# Patient Record
Sex: MALE | Race: WHITE | NOT HISPANIC OR LATINO
[De-identification: names, ages, dates, MRNs, and addresses within clinical notes are randomized per-mention and may not be internally consistent; named-entity substitution may affect disease eponyms.]

---

## 2019-01-07 PROBLEM — Z00.00 ENCOUNTER FOR PREVENTIVE HEALTH EXAMINATION: Status: ACTIVE | Noted: 2019-01-07

## 2019-01-08 ENCOUNTER — RECORD ABSTRACTING (OUTPATIENT)
Age: 59
End: 2019-01-08

## 2019-01-08 DIAGNOSIS — Z80.42 FAMILY HISTORY OF MALIGNANT NEOPLASM OF PROSTATE: ICD-10-CM

## 2019-01-08 DIAGNOSIS — Z82.49 FAMILY HISTORY OF ISCHEMIC HEART DISEASE AND OTHER DISEASES OF THE CIRCULATORY SYSTEM: ICD-10-CM

## 2019-01-08 DIAGNOSIS — Z86.59 PERSONAL HISTORY OF OTHER MENTAL AND BEHAVIORAL DISORDERS: ICD-10-CM

## 2019-01-08 DIAGNOSIS — R41.3 OTHER AMNESIA: ICD-10-CM

## 2019-01-08 DIAGNOSIS — Z87.19 PERSONAL HISTORY OF OTHER DISEASES OF THE DIGESTIVE SYSTEM: ICD-10-CM

## 2019-01-08 DIAGNOSIS — R14.0 ABDOMINAL DISTENSION (GASEOUS): ICD-10-CM

## 2019-01-08 DIAGNOSIS — Z86.39 PERSONAL HISTORY OF OTHER ENDOCRINE, NUTRITIONAL AND METABOLIC DISEASE: ICD-10-CM

## 2019-01-08 RX ORDER — THIAMINE HCL 100 MG
500 TABLET ORAL
Refills: 0 | Status: ACTIVE | COMMUNITY

## 2019-01-08 RX ORDER — CHOLECALCIFEROL (VITAMIN D3) 25 MCG
25 MCG TABLET ORAL
Refills: 0 | Status: ACTIVE | COMMUNITY

## 2019-01-08 RX ORDER — VENLAFAXINE HYDROCHLORIDE 75 MG/1
75 CAPSULE, EXTENDED RELEASE ORAL
Refills: 0 | Status: ACTIVE | COMMUNITY

## 2019-01-09 ENCOUNTER — APPOINTMENT (OUTPATIENT)
Dept: ENDOCRINOLOGY | Facility: CLINIC | Age: 59
End: 2019-01-09
Payer: COMMERCIAL

## 2019-01-09 VITALS
WEIGHT: 197 LBS | HEART RATE: 82 BPM | SYSTOLIC BLOOD PRESSURE: 116 MMHG | DIASTOLIC BLOOD PRESSURE: 72 MMHG | BODY MASS INDEX: 30.92 KG/M2 | HEIGHT: 67 IN

## 2019-01-09 LAB — GLUCOSE BLDC GLUCOMTR-MCNC: 178

## 2019-01-09 PROCEDURE — 99214 OFFICE O/P EST MOD 30 MIN: CPT

## 2019-01-09 PROCEDURE — 82962 GLUCOSE BLOOD TEST: CPT

## 2019-01-09 RX ORDER — AMPHETAMINE SULFATE 10 MG/1
10 TABLET ORAL
Refills: 0 | Status: DISCONTINUED | COMMUNITY
End: 2019-01-09

## 2019-01-09 RX ORDER — LEVOTHYROXINE SODIUM 125 UG/1
125 TABLET ORAL
Refills: 0 | Status: DISCONTINUED | COMMUNITY
End: 2019-01-09

## 2019-01-09 NOTE — PHYSICAL EXAM
[Alert] : alert [No Acute Distress] : no acute distress [Well Nourished] : well nourished [Well Developed] : well developed [Normal Sclera/Conjunctiva] : normal sclera/conjunctiva [EOMI] : extra ocular movement intact [No Proptosis] : no proptosis [Normal Oropharynx] : the oropharynx was normal [Thyroid Not Enlarged] : the thyroid was not enlarged [No Thyroid Nodules] : there were no palpable thyroid nodules [No Respiratory Distress] : no respiratory distress [No Accessory Muscle Use] : no accessory muscle use [Clear to Auscultation] : lungs were clear to auscultation bilaterally [Normal Rate] : heart rate was normal  [Normal S1, S2] : normal S1 and S2 [Regular Rhythm] : with a regular rhythm [Pedal Pulses Normal] : the pedal pulses are present [No Edema] : there was no peripheral edema [Normal Bowel Sounds] : normal bowel sounds [Not Tender] : non-tender [Soft] : abdomen soft [Not Distended] : not distended [Post Cervical Nodes] : posterior cervical nodes [Anterior Cervical Nodes] : anterior cervical nodes [Axillary Nodes] : axillary nodes [Normal] : normal and non tender [No Spinal Tenderness] : no spinal tenderness [Spine Straight] : spine straight [No Stigmata of Cushings Syndrome] : no stigmata of cushings syndrome [Normal Gait] : normal gait [Normal Strength/Tone] : muscle strength and tone were normal [No Rash] : no rash [Acanthosis Nigricans] : no acanthosis nigricans [Normal Reflexes] : deep tendon reflexes were 2+ and symmetric [No Tremors] : no tremors [Oriented x3] : oriented to person, place, and time [de-identified] : midline scar, healed well,

## 2019-01-09 NOTE — HISTORY OF PRESENT ILLNESS
[FreeTextEntry1] : 59 yo WM coming for f/u MNG, abnormal TFTs and prediabetes\par on 125mcg Synthroid since 7/18 from 137mcg Synthroid\par        started Amphetamine with his Neurologist due to memory problems, does not help per pt, started 2/18\par        has sleep apnea, not tolerating the CPAP well\par        started to have hypoglicemia symtoms 2hrs after eating oatmeal with raisins, banana, donughts , sweating, better after eating more donughts \par        on Synthroid MEGHA 137mcg qd dose decreased 3/9/18 from 150mcg qd after labs shwoing high FT4 and supressed TSH\par        repeated labs 4/4/18 TSH 0.12, FT4 1.75 \par        still supressed TSH labs reviewed 7/2018 with normal FT4 \par        9/29/2017 Dr Gonzalez [facility] path reviewed 1mm papillary thyroid cancer one side, then 4mm the other side with no vascular or lymphatic invasion, one lymph node negative \par        neck US normal 7/18\par        7/12/17\par        fasting sugar 113\par        triglicerides down from 300 to 204\par        seen PCP Bernadine Ramirez at [facility]\par        labs 5/2017\par        TSH 0.40 ( 0.49-4.70)\par        Free T3 3.4 (2.3-4.2)\par        FT4 0.82 (0.71-1.85)\par        Lyme + 4/25/17 last rash a year ago , will see a Lyme specialist in a week \par        has sleep apnea, has to repeat sleep study to check the accuracy of his CPAP machine , pending report, has had CPAP x 15 yrs\par        had deviated septum, tonsilectomy 2005\par        c/o extreme fatigue\par        seen Neurology for memory issues getting worse\par        HgA1c 5.7 4/25/17\par        4/25/17 TSH 0.43\par        B12 352\par        Triglycerides 390\par        4/3/14 TSH 0.7590\par        testosterone 428\par        prolactin 4.50\par        Triglycerides 437\par        about five years ago he was on testosterone gel, did nt help per pt, tried for 1-2 months.

## 2019-01-09 NOTE — REVIEW OF SYSTEMS
[Fatigue] : fatigue [Recent Weight Gain (___ Lbs)] : recent [unfilled] ~Ulb weight gain [Gas/Bloating] : gas/bloating [Heartburn] : heartburn [Joint Pain] : joint pain [Muscle Weakness] : muscle weakness [Back Pain] : back pain [Headache] : headaches [Depression] : depression [Anxiety] : anxiety [Insomnia] : insomnia [Negative] : Heme/Lymph [FreeTextEntry5] : shortness of breath with exertion, after eating within a year seen Cardiology 2 yrs ago negative workup per pt [de-identified] : memory problems sees Neurology

## 2019-04-10 ENCOUNTER — APPOINTMENT (OUTPATIENT)
Dept: ENDOCRINOLOGY | Facility: CLINIC | Age: 59
End: 2019-04-10
Payer: COMMERCIAL

## 2019-04-10 VITALS
HEIGHT: 67 IN | HEART RATE: 78 BPM | BODY MASS INDEX: 31.39 KG/M2 | DIASTOLIC BLOOD PRESSURE: 64 MMHG | WEIGHT: 200 LBS | SYSTOLIC BLOOD PRESSURE: 112 MMHG

## 2019-04-10 DIAGNOSIS — E04.1 NONTOXIC SINGLE THYROID NODULE: ICD-10-CM

## 2019-04-10 PROCEDURE — 99214 OFFICE O/P EST MOD 30 MIN: CPT

## 2019-04-10 NOTE — REVIEW OF SYSTEMS
[Fatigue] : fatigue [Recent Weight Gain (___ Lbs)] : recent [unfilled] ~Ulb weight gain [Gas/Bloating] : gas/bloating [Heartburn] : heartburn [Joint Pain] : joint pain [Muscle Weakness] : muscle weakness [Back Pain] : back pain [Headache] : headaches [Anxiety] : anxiety [Depression] : depression [Insomnia] : insomnia [Negative] : Heme/Lymph [FreeTextEntry5] : shortness of breath with exertion, after eating within a year seen Cardiology 2 yrs ago negative workup per pt [de-identified] : memory problems sees Neurology

## 2019-04-10 NOTE — HISTORY OF PRESENT ILLNESS
[FreeTextEntry1] : 59 yo WM coming for f/u MNG, abnormal TFTs and prediabetes\par on 125mcg Synthroid since 7/18 from 137mcg Synthroid\par        started Amphetamine with his Neurologist due to memory problems, does not help per pt, started 2/18\par        has sleep apnea, not tolerating the CPAP well\par        started to have hypoglicemia symtoms 2hrs after eating oatmeal with raisins, banana, donughts , sweating, better after eating more donughts \par        on Synthroid MEGHA 137mcg qd dose decreased 3/9/18 from 150mcg qd after labs shwoing high FT4 and supressed TSH\par        repeated labs 4/4/18 TSH 0.12, FT4 1.75 \par        still supressed TSH labs reviewed 7/2018 with normal FT4 \par        9/29/2017 Dr Gonzalez [facility] path reviewed 1mm papillary thyroid cancer one side, then 4mm the other side with no vascular or lymphatic invasion, one lymph node negative \par        neck US normal 7/18\par        7/12/17\par        fasting sugar 113\par        triglicerides down from 300 to 204\par        seen PCP Bernadine Ramirez at [facility]\par        labs 5/2017\par        TSH 0.40 ( 0.49-4.70)\par        Free T3 3.4 (2.3-4.2)\par        FT4 0.82 (0.71-1.85)\par        Lyme + 4/25/17 last rash a year ago , will see a Lyme specialist in a week \par        has sleep apnea, has to repeat sleep study to check the accuracy of his CPAP machine , pending report, has had CPAP x 15 yrs\par        had deviated septum, tonsilectomy 2005\par        c/o extreme fatigue\par        seen Neurology for memory issues getting worse\par        HgA1c 5.7 4/25/17\par        4/25/17 TSH 0.43\par        B12 352\par        Triglycerides 390\par        4/3/14 TSH 0.7590\par        testosterone 428\par        prolactin 4.50\par        Triglycerides 437\par        about five years ago he was on testosterone gel, did nt help per pt, tried for 1-2 months.

## 2019-04-10 NOTE — PHYSICAL EXAM
[Well Nourished] : well nourished [Alert] : alert [No Acute Distress] : no acute distress [Well Developed] : well developed [Normal Sclera/Conjunctiva] : normal sclera/conjunctiva [No Proptosis] : no proptosis [EOMI] : extra ocular movement intact [Normal Oropharynx] : the oropharynx was normal [Thyroid Not Enlarged] : the thyroid was not enlarged [No Respiratory Distress] : no respiratory distress [No Thyroid Nodules] : there were no palpable thyroid nodules [Clear to Auscultation] : lungs were clear to auscultation bilaterally [No Accessory Muscle Use] : no accessory muscle use [Normal S1, S2] : normal S1 and S2 [Normal Rate] : heart rate was normal  [Pedal Pulses Normal] : the pedal pulses are present [Regular Rhythm] : with a regular rhythm [No Edema] : there was no peripheral edema [Normal Bowel Sounds] : normal bowel sounds [Not Tender] : non-tender [Soft] : abdomen soft [Not Distended] : not distended [Anterior Cervical Nodes] : anterior cervical nodes [Post Cervical Nodes] : posterior cervical nodes [Normal] : normal and non tender [Axillary Nodes] : axillary nodes [No Spinal Tenderness] : no spinal tenderness [No Stigmata of Cushings Syndrome] : no stigmata of cushings syndrome [Spine Straight] : spine straight [Normal Gait] : normal gait [Normal Strength/Tone] : muscle strength and tone were normal [No Rash] : no rash [Normal Reflexes] : deep tendon reflexes were 2+ and symmetric [Acanthosis Nigricans] : no acanthosis nigricans [No Tremors] : no tremors [de-identified] : midline scar, healed well, [Oriented x3] : oriented to person, place, and time

## 2019-07-01 ENCOUNTER — MEDICATION RENEWAL (OUTPATIENT)
Age: 59
End: 2019-07-01

## 2019-07-01 ENCOUNTER — RX RENEWAL (OUTPATIENT)
Age: 59
End: 2019-07-01

## 2019-07-10 ENCOUNTER — APPOINTMENT (OUTPATIENT)
Dept: ENDOCRINOLOGY | Facility: CLINIC | Age: 59
End: 2019-07-10
Payer: COMMERCIAL

## 2019-07-10 VITALS
WEIGHT: 199 LBS | HEIGHT: 67 IN | HEART RATE: 76 BPM | DIASTOLIC BLOOD PRESSURE: 72 MMHG | SYSTOLIC BLOOD PRESSURE: 124 MMHG | BODY MASS INDEX: 31.23 KG/M2

## 2019-07-10 PROCEDURE — 82962 GLUCOSE BLOOD TEST: CPT

## 2019-07-10 PROCEDURE — 83036 HEMOGLOBIN GLYCOSYLATED A1C: CPT | Mod: QW

## 2019-07-10 PROCEDURE — 99214 OFFICE O/P EST MOD 30 MIN: CPT

## 2019-07-12 NOTE — HISTORY OF PRESENT ILLNESS
[FreeTextEntry1] : 58 yo WM coming for f/u MNG, abnormal TFTs and prediabetes\par on 125mcg Synthroid since 7/18 from 137mcg Synthroid\par        started Amphetamine with his Neurologist due to memory problems, does not help per pt, started 2/18\par        has sleep apnea, not tolerating the CPAP well\par        started to have hypoglicemia symtoms 2hrs after eating oatmeal with raisins, banana, donughts , sweating, better after eating more donughts \par        on Synthroid MEGHA 137mcg qd dose decreased 3/9/18 from 150mcg qd after labs shwoing high FT4 and supressed TSH\par        repeated labs 4/4/18 TSH 0.12, FT4 1.75 \par        still supressed TSH labs reviewed 7/2018 with normal FT4 \par        9/29/2017 Dr Gonzalez [facility] path reviewed 1mm papillary thyroid cancer one side, then 4mm the other side with no vascular or lymphatic invasion, one lymph node negative \par        neck US normal 7/18\par        7/12/17\par        fasting sugar 113\par        triglicerides down from 300 to 204\par        seen PCP Bernadine Ramirez at [facility]\par        labs 5/2017\par        TSH 0.40 ( 0.49-4.70)\par        Free T3 3.4 (2.3-4.2)\par        FT4 0.82 (0.71-1.85)\par        Lyme + 4/25/17 last rash a year ago , will see a Lyme specialist in a week \par        has sleep apnea, has to repeat sleep study to check the accuracy of his CPAP machine , pending report, has had CPAP x 15 yrs\par        had deviated septum, tonsilectomy 2005\par        c/o extreme fatigue\par        seen Neurology for memory issues getting worse\par        HgA1c 5.7 4/25/17\par        4/25/17 TSH 0.43\par        B12 352\par        Triglycerides 390\par        4/3/14 TSH 0.7590\par        testosterone 428\par        prolactin 4.50\par        Triglycerides 437\par        about five years ago he was on testosterone gel, did nt help per pt, tried for 1-2 months.

## 2019-07-12 NOTE — PHYSICAL EXAM
[Alert] : alert [No Acute Distress] : no acute distress [Well Nourished] : well nourished [Well Developed] : well developed [Normal Sclera/Conjunctiva] : normal sclera/conjunctiva [EOMI] : extra ocular movement intact [No Proptosis] : no proptosis [Normal Oropharynx] : the oropharynx was normal [Thyroid Not Enlarged] : the thyroid was not enlarged [No Thyroid Nodules] : there were no palpable thyroid nodules [No Respiratory Distress] : no respiratory distress [No Accessory Muscle Use] : no accessory muscle use [Clear to Auscultation] : lungs were clear to auscultation bilaterally [Normal Rate] : heart rate was normal  [Normal S1, S2] : normal S1 and S2 [Regular Rhythm] : with a regular rhythm [Pedal Pulses Normal] : the pedal pulses are present [No Edema] : there was no peripheral edema [Normal Bowel Sounds] : normal bowel sounds [Not Tender] : non-tender [Soft] : abdomen soft [Not Distended] : not distended [Post Cervical Nodes] : posterior cervical nodes [Anterior Cervical Nodes] : anterior cervical nodes [Axillary Nodes] : axillary nodes [Normal] : normal and non tender [No Spinal Tenderness] : no spinal tenderness [Spine Straight] : spine straight [No Stigmata of Cushings Syndrome] : no stigmata of cushings syndrome [Normal Gait] : normal gait [Normal Strength/Tone] : muscle strength and tone were normal [No Rash] : no rash [Normal Reflexes] : deep tendon reflexes were 2+ and symmetric [No Tremors] : no tremors [Oriented x3] : oriented to person, place, and time [Acanthosis Nigricans] : no acanthosis nigricans [de-identified] : midline scar, healed well,

## 2019-07-12 NOTE — REVIEW OF SYSTEMS
[Fatigue] : fatigue [Recent Weight Gain (___ Lbs)] : recent [unfilled] ~Ulb weight gain [Gas/Bloating] : gas/bloating [Heartburn] : heartburn [Joint Pain] : joint pain [Muscle Weakness] : muscle weakness [Back Pain] : back pain [Headache] : headaches [Depression] : depression [Anxiety] : anxiety [Insomnia] : insomnia [Negative] : Heme/Lymph [FreeTextEntry5] : shortness of breath with exertion, after eating within a year seen Cardiology 2 yrs ago negative workup per pt [de-identified] : memory problems sees Neurology

## 2019-09-20 LAB
GLUCOSE BLDC GLUCOMTR-MCNC: 140
HBA1C MFR BLD HPLC: 5.7

## 2019-10-16 ENCOUNTER — APPOINTMENT (OUTPATIENT)
Dept: ENDOCRINOLOGY | Facility: CLINIC | Age: 59
End: 2019-10-16
Payer: COMMERCIAL

## 2019-10-16 VITALS
OXYGEN SATURATION: 100 % | HEART RATE: 71 BPM | BODY MASS INDEX: 30.76 KG/M2 | WEIGHT: 196 LBS | SYSTOLIC BLOOD PRESSURE: 124 MMHG | DIASTOLIC BLOOD PRESSURE: 82 MMHG | HEIGHT: 67 IN

## 2019-10-16 LAB — GLUCOSE BLDC GLUCOMTR-MCNC: 156

## 2019-10-16 PROCEDURE — G0447 BEHAVIOR COUNSEL OBESITY 15M: CPT

## 2019-10-16 PROCEDURE — 82962 GLUCOSE BLOOD TEST: CPT

## 2019-10-16 PROCEDURE — 99215 OFFICE O/P EST HI 40 MIN: CPT | Mod: 25

## 2019-10-16 RX ORDER — ATORVASTATIN CALCIUM 40 MG/1
40 TABLET, FILM COATED ORAL DAILY
Refills: 0 | Status: DISCONTINUED | COMMUNITY
End: 2019-10-16

## 2019-10-16 RX ORDER — B-COMPLEX WITH VITAMIN C
TABLET ORAL
Refills: 0 | Status: DISCONTINUED | COMMUNITY
End: 2019-10-16

## 2019-10-16 RX ORDER — LEVOTHYROXINE SODIUM 112 UG/1
112 TABLET ORAL
Qty: 90 | Refills: 1 | Status: DISCONTINUED | COMMUNITY
Start: 2019-07-01 | End: 2019-10-16

## 2019-10-16 RX ORDER — LEVOTHYROXINE SODIUM 112 UG/1
112 TABLET ORAL DAILY
Qty: 90 | Refills: 1 | Status: DISCONTINUED | COMMUNITY
Start: 2019-01-09 | End: 2019-10-16

## 2019-10-16 NOTE — PHYSICAL EXAM
[Alert] : alert [No Acute Distress] : no acute distress [Well Nourished] : well nourished [Well Developed] : well developed [Normal Sclera/Conjunctiva] : normal sclera/conjunctiva [EOMI] : extra ocular movement intact [No Proptosis] : no proptosis [Normal Oropharynx] : the oropharynx was normal [Thyroid Not Enlarged] : the thyroid was not enlarged [No Thyroid Nodules] : there were no palpable thyroid nodules [No Respiratory Distress] : no respiratory distress [No Accessory Muscle Use] : no accessory muscle use [Clear to Auscultation] : lungs were clear to auscultation bilaterally [Normal Rate] : heart rate was normal  [Normal S1, S2] : normal S1 and S2 [Regular Rhythm] : with a regular rhythm [Pedal Pulses Normal] : the pedal pulses are present [No Edema] : there was no peripheral edema [Normal Bowel Sounds] : normal bowel sounds [Not Tender] : non-tender [Soft] : abdomen soft [Not Distended] : not distended [Post Cervical Nodes] : posterior cervical nodes [Anterior Cervical Nodes] : anterior cervical nodes [Axillary Nodes] : axillary nodes [Normal] : normal and non tender [No Spinal Tenderness] : no spinal tenderness [Spine Straight] : spine straight [No Stigmata of Cushings Syndrome] : no stigmata of cushings syndrome [Normal Gait] : normal gait [Normal Strength/Tone] : muscle strength and tone were normal [No Rash] : no rash [Normal Reflexes] : deep tendon reflexes were 2+ and symmetric [No Tremors] : no tremors [Oriented x3] : oriented to person, place, and time [Acanthosis Nigricans] : no acanthosis nigricans [de-identified] : midline scar, healed well,

## 2019-10-16 NOTE — REVIEW OF SYSTEMS
[Fatigue] : fatigue [Recent Weight Gain (___ Lbs)] : recent [unfilled] ~Ulb weight gain [Gas/Bloating] : gas/bloating [Heartburn] : heartburn [Joint Pain] : joint pain [Muscle Weakness] : muscle weakness [Back Pain] : back pain [Headache] : headaches [Depression] : depression [Anxiety] : anxiety [Insomnia] : insomnia [Negative] : Heme/Lymph [FreeTextEntry5] : shortness of breath with exertion, after eating within a year seen Cardiology 2 yrs ago negative workup per pt [de-identified] : memory problems sees Neurology

## 2019-10-16 NOTE — REVIEW OF SYSTEMS
[Fatigue] : fatigue [Recent Weight Gain (___ Lbs)] : recent [unfilled] ~Ulb weight gain [Gas/Bloating] : gas/bloating [Heartburn] : heartburn [Joint Pain] : joint pain [Muscle Weakness] : muscle weakness [Back Pain] : back pain [Headache] : headaches [Depression] : depression [Anxiety] : anxiety [Insomnia] : insomnia [Negative] : Heme/Lymph [FreeTextEntry5] : shortness of breath with exertion, after eating within a year seen Cardiology 2 yrs ago negative workup per pt [de-identified] : memory problems sees Neurology

## 2019-10-16 NOTE — HISTORY OF PRESENT ILLNESS
[FreeTextEntry1] : 58 yo WM coming for f/u MNG, abnormal TFTs and prediabetes\par on 112mcg Synthroid  except Sundays takes half a tab only\par has also coming up steve with the ENT surgeon sees him once a year Dr Gonzalez \par        started Amphetamine with his Neurologist due to memory problems, does not help per pt, started 2/18\par        has sleep apnea, not tolerating the CPAP well\par        started to have hypoglicemia symtoms 2hrs after eating oatmeal with raisins, banana, donughts , sweating, better after eating more donughts \par \par        still supressed TSH labs reviewed 7/2018 with normal FT4 \par        9/29/2017 Dr Gonzalez Gonzalez [place] path reviewed 1mm papillary thyroid cancer one side, then 4mm the other side with no vascular or lymphatic invasion, one lymph node negative \par        neck US normal 7/18\par        7/12/17\par        fasting sugar 113\par        triglicerides down from 300 to 204\par        seen PCP Bernadine Ramirez at [facility]\par        labs 5/2017\par        TSH 0.40 ( 0.49-4.70)\par        Free T3 3.4 (2.3-4.2)\par        FT4 0.82 (0.71-1.85)\par        Lyme + 4/25/17 last rash a year ago , will see a Lyme specialist in a week \par        has sleep apnea, has to repeat sleep study to check the accuracy of his CPAP machine , pending report, has had CPAP x 15 yrs\par        had deviated septum, tonsilectomy 2005\par        c/o extreme fatigue\par        seen Neurology for memory issues getting worse\par        HgA1c 5.7 4/25/17\par        4/25/17 TSH 0.43\par        B12 352\par        Triglycerides 390\par        4/3/14 TSH 0.7590\par        testosterone 428\par        prolactin 4.50\par        Triglycerides 437\par        about five years ago he was on testosterone gel, did nt help per pt, tried for 1-2 months.

## 2019-10-16 NOTE — HISTORY OF PRESENT ILLNESS
[FreeTextEntry1] : 60 yo WM coming for f/u MNG, abnormal TFTs and prediabetes\par on 112mcg Synthroid  except Sundays takes half a tab only\par has also coming up steve with the ENT surgeon sees him once a year Dr Gonzalez \par        started Amphetamine with his Neurologist due to memory problems, does not help per pt, started 2/18\par        has sleep apnea, not tolerating the CPAP well\par        started to have hypoglicemia symtoms 2hrs after eating oatmeal with raisins, banana, donughts , sweating, better after eating more donughts \par \par        still supressed TSH labs reviewed 7/2018 with normal FT4 \par        9/29/2017 Dr Gonzalez Gonzalez [place] path reviewed 1mm papillary thyroid cancer one side, then 4mm the other side with no vascular or lymphatic invasion, one lymph node negative \par        neck US normal 7/18\par        7/12/17\par        fasting sugar 113\par        triglicerides down from 300 to 204\par        seen PCP Bernadine Ramirez at [facility]\par        labs 5/2017\par        TSH 0.40 ( 0.49-4.70)\par        Free T3 3.4 (2.3-4.2)\par        FT4 0.82 (0.71-1.85)\par        Lyme + 4/25/17 last rash a year ago , will see a Lyme specialist in a week \par        has sleep apnea, has to repeat sleep study to check the accuracy of his CPAP machine , pending report, has had CPAP x 15 yrs\par        had deviated septum, tonsilectomy 2005\par        c/o extreme fatigue\par        seen Neurology for memory issues getting worse\par        HgA1c 5.7 4/25/17\par        4/25/17 TSH 0.43\par        B12 352\par        Triglycerides 390\par        4/3/14 TSH 0.7590\par        testosterone 428\par        prolactin 4.50\par        Triglycerides 437\par        about five years ago he was on testosterone gel, did nt help per pt, tried for 1-2 months.

## 2019-10-16 NOTE — PHYSICAL EXAM
[Alert] : alert [No Acute Distress] : no acute distress [Well Nourished] : well nourished [Well Developed] : well developed [Normal Sclera/Conjunctiva] : normal sclera/conjunctiva [EOMI] : extra ocular movement intact [No Proptosis] : no proptosis [Normal Oropharynx] : the oropharynx was normal [Thyroid Not Enlarged] : the thyroid was not enlarged [No Thyroid Nodules] : there were no palpable thyroid nodules [No Respiratory Distress] : no respiratory distress [No Accessory Muscle Use] : no accessory muscle use [Clear to Auscultation] : lungs were clear to auscultation bilaterally [Normal Rate] : heart rate was normal  [Normal S1, S2] : normal S1 and S2 [Regular Rhythm] : with a regular rhythm [Pedal Pulses Normal] : the pedal pulses are present [No Edema] : there was no peripheral edema [Normal Bowel Sounds] : normal bowel sounds [Not Tender] : non-tender [Soft] : abdomen soft [Not Distended] : not distended [Post Cervical Nodes] : posterior cervical nodes [Anterior Cervical Nodes] : anterior cervical nodes [Axillary Nodes] : axillary nodes [Normal] : normal and non tender [No Spinal Tenderness] : no spinal tenderness [Spine Straight] : spine straight [No Stigmata of Cushings Syndrome] : no stigmata of cushings syndrome [Normal Gait] : normal gait [Normal Strength/Tone] : muscle strength and tone were normal [No Rash] : no rash [Normal Reflexes] : deep tendon reflexes were 2+ and symmetric [No Tremors] : no tremors [Oriented x3] : oriented to person, place, and time [Acanthosis Nigricans] : no acanthosis nigricans [de-identified] : midline scar, healed well,

## 2019-11-27 PROBLEM — E04.1 RIGHT THYROID NODULE: Status: RESOLVED | Noted: 2019-01-08 | Resolved: 2019-11-27

## 2020-01-08 ENCOUNTER — APPOINTMENT (OUTPATIENT)
Dept: ENDOCRINOLOGY | Facility: CLINIC | Age: 60
End: 2020-01-08
Payer: COMMERCIAL

## 2020-01-08 VITALS
OXYGEN SATURATION: 97 % | BODY MASS INDEX: 30.92 KG/M2 | WEIGHT: 197 LBS | SYSTOLIC BLOOD PRESSURE: 116 MMHG | HEART RATE: 69 BPM | DIASTOLIC BLOOD PRESSURE: 70 MMHG | HEIGHT: 67 IN

## 2020-01-08 DIAGNOSIS — R42 DIZZINESS AND GIDDINESS: ICD-10-CM

## 2020-01-08 DIAGNOSIS — R94.6 ABNORMAL RESULTS OF THYROID FUNCTION STUDIES: ICD-10-CM

## 2020-01-08 LAB — GLUCOSE BLDC GLUCOMTR-MCNC: 128

## 2020-01-08 PROCEDURE — 99214 OFFICE O/P EST MOD 30 MIN: CPT | Mod: 25

## 2020-01-08 PROCEDURE — 82962 GLUCOSE BLOOD TEST: CPT

## 2020-01-08 NOTE — REVIEW OF SYSTEMS
[Fatigue] : fatigue [Gas/Bloating] : gas/bloating [Heartburn] : heartburn [Recent Weight Gain (___ Lbs)] : recent [unfilled] ~Ulb weight gain [Muscle Weakness] : muscle weakness [Joint Pain] : joint pain [Headache] : headaches [Back Pain] : back pain [Depression] : depression [Anxiety] : anxiety [Insomnia] : insomnia [Negative] : Heme/Lymph [FreeTextEntry5] : shortness of breath with exertion, after eating within a year seen Cardiology 2 yrs ago negative workup per pt [de-identified] : memory problems sees Neurology

## 2020-01-08 NOTE — PHYSICAL EXAM
[Alert] : alert [No Acute Distress] : no acute distress [Well Nourished] : well nourished [Well Developed] : well developed [Normal Sclera/Conjunctiva] : normal sclera/conjunctiva [No Proptosis] : no proptosis [EOMI] : extra ocular movement intact [Normal Oropharynx] : the oropharynx was normal [No Thyroid Nodules] : there were no palpable thyroid nodules [Thyroid Not Enlarged] : the thyroid was not enlarged [No Accessory Muscle Use] : no accessory muscle use [No Respiratory Distress] : no respiratory distress [Clear to Auscultation] : lungs were clear to auscultation bilaterally [Normal Rate] : heart rate was normal  [Normal S1, S2] : normal S1 and S2 [Regular Rhythm] : with a regular rhythm [Pedal Pulses Normal] : the pedal pulses are present [No Edema] : there was no peripheral edema [Normal Bowel Sounds] : normal bowel sounds [Soft] : abdomen soft [Not Tender] : non-tender [Not Distended] : not distended [Anterior Cervical Nodes] : anterior cervical nodes [Post Cervical Nodes] : posterior cervical nodes [Axillary Nodes] : axillary nodes [Normal] : normal and non tender [Spine Straight] : spine straight [No Spinal Tenderness] : no spinal tenderness [Normal Gait] : normal gait [No Stigmata of Cushings Syndrome] : no stigmata of cushings syndrome [No Rash] : no rash [Acanthosis Nigricans] : no acanthosis nigricans [Normal Strength/Tone] : muscle strength and tone were normal [Normal Reflexes] : deep tendon reflexes were 2+ and symmetric [No Tremors] : no tremors [de-identified] : midline scar, healed well, [Oriented x3] : oriented to person, place, and time

## 2020-01-08 NOTE — HISTORY OF PRESENT ILLNESS
[FreeTextEntry1] : 60 yo WM coming for f/u MNG, abnormal TFTs and prediabetes\par on 100mcg Synthroid  dose decreased on 10/2019\par has also coming up steve with the ENT surgeon sees him once a year Dr Gonzalez \par        started Amphetamine with his Neurologist due to memory problems, does not help per pt, started 2/18\par        has sleep apnea, not tolerating the CPAP well\par        started to have hypoglicemia symtoms 2hrs after eating oatmeal with raisins, banana, donughts , sweating, better after eating more donughts \par \par        still supressed TSH labs reviewed 7/2018 with normal FT4 \par        9/29/2017 Dr Gonzalez Gonzalez [place] path reviewed 1mm papillary thyroid cancer one side, then 4mm the other side with no vascular or lymphatic invasion, one lymph node negative \par        neck US normal 7/18\par        7/12/17\par        fasting sugar 113\par        triglicerides down from 300 to 204\par        seen PCP Bernadine Ramirez at [facility]\par        labs 5/2017\par        TSH 0.40 ( 0.49-4.70)\par        Free T3 3.4 (2.3-4.2)\par        FT4 0.82 (0.71-1.85)\par        Lyme + 4/25/17 last rash a year ago , will see a Lyme specialist in a week \par        has sleep apnea, has to repeat sleep study to check the accuracy of his CPAP machine , pending report, has had CPAP x 15 yrs\par        had deviated septum, tonsilectomy 2005\par        c/o extreme fatigue\par        seen Neurology for memory issues getting worse\par        HgA1c 5.7 4/25/17\par        4/25/17 TSH 0.43\par        B12 352\par        Triglycerides 390\par        4/3/14 TSH 0.7590\par        testosterone 428\par        prolactin 4.50\par        Triglycerides 437\par        about five years ago he was on testosterone gel, did nt help per pt, tried for 1-2 months.

## 2020-03-18 ENCOUNTER — RX RENEWAL (OUTPATIENT)
Age: 60
End: 2020-03-18

## 2020-04-15 ENCOUNTER — APPOINTMENT (OUTPATIENT)
Dept: ENDOCRINOLOGY | Facility: CLINIC | Age: 60
End: 2020-04-15
Payer: COMMERCIAL

## 2020-04-15 ENCOUNTER — APPOINTMENT (OUTPATIENT)
Dept: ENDOCRINOLOGY | Facility: CLINIC | Age: 60
End: 2020-04-15

## 2020-04-15 PROCEDURE — 99214 OFFICE O/P EST MOD 30 MIN: CPT | Mod: 95

## 2020-04-15 NOTE — HISTORY OF PRESENT ILLNESS
[Home] : at home, [unfilled] , at the time of the visit. [Medical Office: ([facility])___] : at the medical office located in  [Spouse] : spouse [FreeTextEntry1] : 60 yo WM coming for f/u MNG, abnormal TFTs and prediabetes\par on 100mcg Synthroid  dose decreased on 10/2019\par feels better eating less concentrated CHO\par also started therapy for restless leg syndrome, sleeping better but not at his best\par has also coming up steve with the ENT surgeon sees him once a year Dr Gonzalez \par        started Amphetamine with his Neurologist due to memory problems, does not help per pt, started 2/18\par        has sleep apnea, not tolerating the CPAP well\par        started to have hypoglicemia symtoms 2hrs after eating oatmeal with raisins, banana, doughnuts , sweating, better after eating more doughnuts \par \par        still suppressed TSH labs reviewed 7/2018 with normal FT4 \par        9/29/2017 Dr Gonzalez Gonzalez [place] path reviewed 1mm papillary thyroid cancer one side, then 4mm the other side with no vascular or lymphatic invasion, one lymph node negative \par        neck US normal 7/18\par        7/12/17\par        fasting sugar 113\par        triglycerides down from 300 to 204\par        seen PCP Bernadine Ramirez at [facility]\par        labs 5/2017\par        TSH 0.40 ( 0.49-4.70)\par        Free T3 3.4 (2.3-4.2)\par        FT4 0.82 (0.71-1.85)\par        Lyme + 4/25/17 last rash a year ago , will see a Lyme specialist in a week \par        has sleep apnea, has to repeat sleep study to check the accuracy of his CPAP machine , pending report, has had CPAP x 15 yrs\par        had deviated septum, tonsillectomy 2005\par        c/o extreme fatigue\par        seen Neurology for memory issues getting worse\par        HgA1c 5.7 4/25/17\par        4/25/17 TSH 0.43\par        B12 352\par        Triglycerides 390\par        4/3/14 TSH 0.7590\par        testosterone 428\par        prolactin 4.50\par        Triglycerides 437\par        about five years ago he was on testosterone gel, did nt help per pt, tried for 1-2 months.

## 2020-05-26 ENCOUNTER — RX RENEWAL (OUTPATIENT)
Age: 60
End: 2020-05-26

## 2020-07-22 ENCOUNTER — APPOINTMENT (OUTPATIENT)
Dept: ENDOCRINOLOGY | Facility: CLINIC | Age: 60
End: 2020-07-22
Payer: COMMERCIAL

## 2020-07-22 VITALS
HEIGHT: 67 IN | OXYGEN SATURATION: 98 % | TEMPERATURE: 96.7 F | DIASTOLIC BLOOD PRESSURE: 70 MMHG | WEIGHT: 199 LBS | HEART RATE: 74 BPM | SYSTOLIC BLOOD PRESSURE: 118 MMHG | BODY MASS INDEX: 31.23 KG/M2

## 2020-07-22 PROCEDURE — 99214 OFFICE O/P EST MOD 30 MIN: CPT | Mod: 25

## 2020-07-22 PROCEDURE — G0447 BEHAVIOR COUNSEL OBESITY 15M: CPT

## 2020-07-22 NOTE — HISTORY OF PRESENT ILLNESS
[FreeTextEntry1] : 59 yo WM coming for f/u MNG, abnormal TFTs and prediabetes\par on 100mcg Synthroid  dose decreased on 10/2019\par feels better eating less concentrated CHO\par also started therapy for restless leg syndrome, sleeping better but not at his best\par has also coming up steve with the ENT surgeon sees him once a year Dr Gonzalez \par        started Amphetamine with his Neurologist due to memory problems, does not help per pt, started 2/18\par        has sleep apnea, not tolerating the CPAP well\par        started to have hypoglicemia symtoms 2hrs after eating oatmeal with raisins, banana, doughnuts , sweating, better after eating more doughnuts \par \par        still suppressed TSH labs reviewed 7/2018 with normal FT4 \par        9/29/2017 Dr Gonzalez Gonzalez [place] path reviewed 1mm papillary thyroid cancer one side, then 4mm the other side with no vascular or lymphatic invasion, one lymph node negative \par        neck US normal 7/18\par        7/12/17\par        fasting sugar 113\par        triglycerides down from 300 to 204\par        seen PCP Bernadine Ramirez at [facility]\par        labs 5/2017\par        TSH 0.40 ( 0.49-4.70)\par        Free T3 3.4 (2.3-4.2)\par        FT4 0.82 (0.71-1.85)\par        Lyme + 4/25/17 last rash a year ago , will see a Lyme specialist in a week \par        has sleep apnea, has to repeat sleep study to check the accuracy of his CPAP machine , pending report, has had CPAP x 15 yrs\par        had deviated septum, tonsillectomy 2005\par        c/o extreme fatigue\par        seen Neurology for memory issues getting worse\par        HgA1c 5.7 4/25/17\par        4/25/17 TSH 0.43\par        B12 352\par        Triglycerides 390\par        4/3/14 TSH 0.7590\par        testosterone 428\par        prolactin 4.50\par        Triglycerides 437\par        about five years ago he was on testosterone gel, did nt help per pt, tried for 1-2 months.

## 2020-07-22 NOTE — REVIEW OF SYSTEMS
[de-identified] : Constitutional: fatigue and recent weight loss: 7 lbs. \par Respiratory: shortness of breath on exertion. \par Musculoskeletal: joint pain, muscle weakness and back pain. \par Neurological: headaches . memory problems seen Neurology. \par Psychiatric: depression, insomnia and anxiety. \par Constitutional, Eyes, ENT, Cardiovascular, Respiratory, Gastrointestinal, Genitourinary, Musculoskeletal, Integumentary, Neurological, Psychiatric, Endocrine and Heme/Lymph are otherwise negative.  \par  \par

## 2020-08-05 ENCOUNTER — RX RENEWAL (OUTPATIENT)
Age: 60
End: 2020-08-05

## 2020-09-28 ENCOUNTER — RX RENEWAL (OUTPATIENT)
Age: 60
End: 2020-09-28

## 2020-12-02 ENCOUNTER — APPOINTMENT (OUTPATIENT)
Dept: ENDOCRINOLOGY | Facility: CLINIC | Age: 60
End: 2020-12-02
Payer: COMMERCIAL

## 2020-12-02 VITALS — HEIGHT: 67 IN | WEIGHT: 192 LBS | BODY MASS INDEX: 30.13 KG/M2

## 2020-12-02 PROCEDURE — 99214 OFFICE O/P EST MOD 30 MIN: CPT | Mod: 25,95

## 2020-12-02 PROCEDURE — G0447 BEHAVIOR COUNSEL OBESITY 15M: CPT | Mod: 95

## 2020-12-15 NOTE — REVIEW OF SYSTEMS
[Fatigue] : fatigue [Recent Weight Loss (___ Lbs)] : recent weight loss: [unfilled] lbs [SOB on Exertion] : shortness of breath on exertion [Joint Pain] : joint pain [Muscle Weakness] : muscle weakness [Back Pain] : back pain [Headaches] : headaches [Depression] : depression [Insomnia] : insomnia [Anxiety] : anxiety [Negative] : Heme/Lymph [de-identified] : memory problems  seen Neurology

## 2020-12-15 NOTE — HISTORY OF PRESENT ILLNESS
[Home] : at home, [unfilled] , at the time of the visit. [Medical Office: ([facility])___] : at the medical office located in  [Verbal consent obtained from patient] : the patient, [unfilled] [FreeTextEntry1] : 59 yo WM coming for f/u MNG, abnormal TFTs and prediabetes\par on 100mcg Synthroid  dose decreased on 10/2019\par feels better eating less concentrated CHO\par also started therapy for restless leg syndrome, sleeping better but not at his best\par has also coming up steve with the ENT surgeon sees him once a year Dr Gonzalez \par        started Amphetamine with his Neurologist due to memory problems, does not help per pt, started 2/18\par        has sleep apnea, not tolerating the CPAP well\par        started to have hypoglicemia symtoms 2hrs after eating oatmeal with raisins, banana, doughnuts , sweating, better after eating more doughnuts \par \par        still suppressed TSH labs reviewed 7/2018 with normal FT4 \par        9/29/2017 Dr Gonzalez [place] path reviewed 1mm papillary thyroid cancer one side, then 4mm the other side with no vascular or lymphatic invasion, one lymph node negative \par        neck US normal 7/18\par        7/12/17\par        fasting sugar 113\par        triglycerides down from 300 to 204\par        seen PCP Bernadine Ramirez at [facility]\par        labs 5/2017\par        TSH 0.40 ( 0.49-4.70)\par        Free T3 3.4 (2.3-4.2)\par        FT4 0.82 (0.71-1.85)\par        Lyme + 4/25/17 last rash a year ago , will see a Lyme specialist in a week \par        has sleep apnea, has to repeat sleep study to check the accuracy of his CPAP machine , pending report, has had CPAP x 15 yrs\par        had deviated septum, tonsillectomy 2005\par        c/o extreme fatigue\par        seen Neurology for memory issues getting worse\par        HgA1c 5.7 4/25/17\par        4/25/17 TSH 0.43\par        B12 352\par        Triglycerides 390\par        4/3/14 TSH 0.7590\par        testosterone 428\par        prolactin 4.50\par        Triglycerides 437\par        about five years ago he was on testosterone gel, did nt help per pt, tried for 1-2 months.

## 2021-01-04 ENCOUNTER — RX RENEWAL (OUTPATIENT)
Age: 61
End: 2021-01-04

## 2021-03-17 ENCOUNTER — APPOINTMENT (OUTPATIENT)
Dept: ENDOCRINOLOGY | Facility: CLINIC | Age: 61
End: 2021-03-17
Payer: COMMERCIAL

## 2021-03-17 VITALS — WEIGHT: 186 LBS | BODY MASS INDEX: 29.19 KG/M2 | HEIGHT: 67 IN

## 2021-03-17 PROCEDURE — 99214 OFFICE O/P EST MOD 30 MIN: CPT | Mod: 95

## 2021-03-17 NOTE — HISTORY OF PRESENT ILLNESS
[Home] : at home, [unfilled] , at the time of the visit. [Medical Office: ([facility])___] : at the medical office located in  [Verbal consent obtained from patient] : the patient, [unfilled] [FreeTextEntry1] : 61 yo WM coming for f/u MNG, abnormal TFTs and prediabetes\par had COVID 2/2021 lost 10lb \par on 100mcg Synthroid  dose decreased on 10/2019\par feels better eating less concentrated CHO\par also started therapy for restless leg syndrome, sleeping better but not at his best\par has also coming up steve with the ENT surgeon sees him once a year Dr Gonzalez \par        started Amphetamine with his Neurologist due to memory problems, does not help per pt, started 2/18\par        has sleep apnea, not tolerating the CPAP well\par        started to have hypoglicemia symtoms 2hrs after eating oatmeal with raisins, banana, doughnuts , sweating, better after eating more doughnuts \par \par        still suppressed TSH labs reviewed 7/2018 with normal FT4 \par        9/29/2017 Dr Gonzalez Gonzalez [place] path reviewed 1mm papillary thyroid cancer one side, then 4mm the other side with no vascular or lymphatic invasion, one lymph node negative \par        neck US normal 7/18\par        7/12/17\par        fasting sugar 113\par        triglycerides down from 300 to 204\par        seen PCP Bernadine Ramirez at [facility]\par        labs 5/2017\par        TSH 0.40 ( 0.49-4.70)\par        Free T3 3.4 (2.3-4.2)\par        FT4 0.82 (0.71-1.85)\par        Lyme + 4/25/17 last rash a year ago , will see a Lyme specialist in a week \par        has sleep apnea, has to repeat sleep study to check the accuracy of his CPAP machine , pending report, has had CPAP x 15 yrs\par        had deviated septum, tonsillectomy 2005\par        c/o extreme fatigue\par        seen Neurology for memory issues getting worse\par        HgA1c 5.7 4/25/17\par        4/25/17 TSH 0.43\par        B12 352\par        Triglycerides 390\par        4/3/14 TSH 0.7590\par        testosterone 428\par        prolactin 4.50\par        Triglycerides 437\par        about five years ago he was on testosterone gel, did nt help per pt, tried for 1-2 months.

## 2021-03-17 NOTE — REVIEW OF SYSTEMS
[de-identified] : Constitutional: fatigue and recent weight loss: 7 lbs. \par Respiratory: shortness of breath on exertion. \par Musculoskeletal: joint pain, muscle weakness and back pain. \par Neurological: headaches . memory problems seen Neurology. \par Psychiatric: depression, insomnia and anxiety. \par Constitutional, Eyes, ENT, Cardiovascular, Respiratory, Gastrointestinal, Genitourinary, Musculoskeletal, Integumentary, Neurological, Psychiatric, Endocrine and Heme/Lymph are otherwise negative.  \par  \par

## 2021-06-09 ENCOUNTER — RX RENEWAL (OUTPATIENT)
Age: 61
End: 2021-06-09

## 2021-07-21 ENCOUNTER — APPOINTMENT (OUTPATIENT)
Dept: ENDOCRINOLOGY | Facility: CLINIC | Age: 61
End: 2021-07-21
Payer: COMMERCIAL

## 2021-07-21 VITALS — WEIGHT: 193 LBS | BODY MASS INDEX: 30.29 KG/M2 | HEIGHT: 67 IN

## 2021-07-21 PROCEDURE — G0447 BEHAVIOR COUNSEL OBESITY 15M: CPT | Mod: 95

## 2021-07-21 PROCEDURE — 99215 OFFICE O/P EST HI 40 MIN: CPT | Mod: 95,25

## 2021-07-23 NOTE — REVIEW OF SYSTEMS
[SOB on Exertion] : shortness of breath on exertion [Palpitations] : no palpitations [Cough] : no cough [FreeTextEntry5] : May 2021 heart monitor was normal  [FreeTextEntry6] : after pneumonia sees Pulmonary and Cardiology [de-identified] : Constitutional: fatigue and recent weight loss: 7 lbs. \par Respiratory: shortness of breath on exertion. \par Musculoskeletal: joint pain, muscle weakness and back pain. \par Neurological: headaches . memory problems seen Neurology. \par Psychiatric: depression, insomnia and anxiety. \par Constitutional, Eyes, ENT, Cardiovascular, Respiratory, Gastrointestinal, Genitourinary, Musculoskeletal, Integumentary, Neurological, Psychiatric, Endocrine and Heme/Lymph are otherwise negative.  \par  \par

## 2021-07-23 NOTE — HISTORY OF PRESENT ILLNESS
[Home] : at home, [unfilled] , at the time of the visit. [Medical Office: ([facility])___] : at the medical office located in  [Verbal consent obtained from patient] : the patient, [unfilled] [FreeTextEntry1] : 62 yo WM coming for f/u MNG, abnormal TFTs and prediabetes\par had COVID 2/2021 lost 10lb then gained back still has SOB \par on 100mcg Synthroid  dose decreased on 10/2019\par feels better eating less concentrated CHO\par also started therapy for restless leg syndrome, sleeping better but not at his best\par has also coming up steve with the ENT surgeon sees him once a year Dr Gonzalez \par        started Amphetamine with his Neurologist due to memory problems, does not help per pt, started 2/18\par        has sleep apnea, not tolerating the CPAP well\par        started to have hypoglicemia symtoms 2hrs after eating oatmeal with raisins, banana, doughnuts , sweating, better after eating more doughnuts \par \par        still suppressed TSH labs reviewed 7/2018 with normal FT4 \par        9/29/2017 Dr Gonzalez Gonzalez [place] path reviewed 1mm papillary thyroid cancer one side, then 4mm the other side with no vascular or lymphatic invasion, one lymph node negative \par        neck US normal 7/18\par        7/12/17\par        fasting sugar 113\par        triglycerides down from 300 to 204\par        seen PCP Bernadine Ramirez at [facility]\par        labs 5/2017\par        TSH 0.40 ( 0.49-4.70)\par        Free T3 3.4 (2.3-4.2)\par        FT4 0.82 (0.71-1.85)\par        Lyme + 4/25/17 last rash a year ago , will see a Lyme specialist in a week \par        has sleep apnea, has to repeat sleep study to check the accuracy of his CPAP machine , pending report, has had CPAP x 15 yrs\par        had deviated septum, tonsillectomy 2005\par        c/o extreme fatigue\par        seen Neurology for memory issues getting worse\par        HgA1c 5.7 4/25/17\par        4/25/17 TSH 0.43\par        B12 352\par        Triglycerides 390\par        4/3/14 TSH 0.7590\par        testosterone 428\par        prolactin 4.50\par        Triglycerides 437\par        about five years ago he was on testosterone gel, did nt help per pt, tried for 1-2 months.

## 2021-11-18 ENCOUNTER — APPOINTMENT (OUTPATIENT)
Dept: ENDOCRINOLOGY | Facility: CLINIC | Age: 61
End: 2021-11-18
Payer: COMMERCIAL

## 2021-11-18 PROCEDURE — 99443: CPT

## 2021-11-18 NOTE — HISTORY OF PRESENT ILLNESS
[Home] : at home, [unfilled] , at the time of the visit. [Medical Office: ([facility])___] : at the medical office located in  [Verbal consent obtained from patient] : the patient, [unfilled] [FreeTextEntry1] : 61 yo WM coming for f/u MNG, abnormal TFTs and prediabetes\par had COVID 2/2021 lost 10lb \par on 100mcg Synthroid  dose decreased on 10/2019\par feels better eating less concentrated CHO\par also started therapy for restless leg syndrome, sleeping better but not at his best\par has also coming up steve with the ENT surgeon sees him once a year Dr Gonzalez \par        started Amphetamine with his Neurologist due to memory problems, does not help per pt, started 2/18\par        has sleep apnea, not tolerating the CPAP well\par        started to have hypoglicemia symtoms 2hrs after eating oatmeal with raisins, banana, doughnuts , sweating, better after eating more doughnuts \par \par        still suppressed TSH labs reviewed 7/2018 with normal FT4 \par        9/29/2017 Dr Gonzalez Gonzalez [place] path reviewed 1mm papillary thyroid cancer one side, then 4mm the other side with no vascular or lymphatic invasion, one lymph node negative \par        neck US normal 7/18\par        7/12/17\par        fasting sugar 113\par        triglycerides down from 300 to 204\par        seen PCP Bernadine Ramirez at [facility]\par        labs 5/2017\par        TSH 0.40 ( 0.49-4.70)\par        Free T3 3.4 (2.3-4.2)\par        FT4 0.82 (0.71-1.85)\par        Lyme + 4/25/17 last rash a year ago , will see a Lyme specialist in a week \par        has sleep apnea, has to repeat sleep study to check the accuracy of his CPAP machine , pending report, has had CPAP x 15 yrs\par        had deviated septum, tonsillectomy 2005\par        c/o extreme fatigue\par        seen Neurology for memory issues getting worse\par        HgA1c 5.7 4/25/17\par        4/25/17 TSH 0.43\par        B12 352\par        Triglycerides 390\par        4/3/14 TSH 0.7590\par        testosterone 428\par        prolactin 4.50\par        Triglycerides 437\par        about five years ago he was on testosterone gel, did nt help per pt, tried for 1-2 months.

## 2021-11-18 NOTE — REVIEW OF SYSTEMS
[de-identified] : Constitutional: fatigue and recent weight loss: 7 lbs. \par Respiratory: shortness of breath on exertion. \par Musculoskeletal: joint pain, muscle weakness and back pain. \par Neurological: headaches . memory problems seen Neurology. \par Psychiatric: depression, insomnia and anxiety. \par Constitutional, Eyes, ENT, Cardiovascular, Respiratory, Gastrointestinal, Genitourinary, Musculoskeletal, Integumentary, Neurological, Psychiatric, Endocrine and Heme/Lymph are otherwise negative.  \par  \par

## 2022-02-16 ENCOUNTER — APPOINTMENT (OUTPATIENT)
Dept: ENDOCRINOLOGY | Facility: CLINIC | Age: 62
End: 2022-02-16
Payer: COMMERCIAL

## 2022-02-16 VITALS — WEIGHT: 190 LBS | HEIGHT: 67 IN | BODY MASS INDEX: 29.82 KG/M2

## 2022-02-16 DIAGNOSIS — G25.81 RESTLESS LEGS SYNDROME: ICD-10-CM

## 2022-02-16 PROCEDURE — 99215 OFFICE O/P EST HI 40 MIN: CPT | Mod: 95

## 2022-02-16 RX ORDER — NAPROXEN 500 MG/1
500 TABLET ORAL
Refills: 0 | Status: DISCONTINUED | COMMUNITY
End: 2022-02-16

## 2022-02-16 RX ORDER — PRAMIPEXOLE DIHYDROCHLORIDE 0.12 MG/1
0.12 TABLET ORAL
Refills: 0 | Status: DISCONTINUED | COMMUNITY
Start: 2020-04-15 | End: 2022-02-16

## 2022-02-16 RX ORDER — CALCIUM CARBONATE 300MG(750)
1000 TABLET,CHEWABLE ORAL DAILY
Refills: 0 | Status: DISCONTINUED | COMMUNITY
End: 2022-02-16

## 2022-02-16 NOTE — REVIEW OF SYSTEMS
[SOB on Exertion] : shortness of breath on exertion [Palpitations] : no palpitations [Cough] : no cough [FreeTextEntry5] : May 2021 heart monitor was normal  [FreeTextEntry6] : after pneumonia sees Pulmonary and Cardiology [de-identified] : Constitutional: fatigue and recent weight loss: 7 lbs. \par Respiratory: shortness of breath on exertion. \par Musculoskeletal: joint pain, muscle weakness and back pain. \par Neurological: headaches . memory problems seen Neurology. \par Psychiatric: depression, insomnia and anxiety. \par Constitutional, Eyes, ENT, Cardiovascular, Respiratory, Gastrointestinal, Genitourinary, Musculoskeletal, Integumentary, Neurological, Psychiatric, Endocrine and Heme/Lymph are otherwise negative.  \par  \par

## 2022-02-16 NOTE — HISTORY OF PRESENT ILLNESS
[Home] : at home, [unfilled] , at the time of the visit. [Medical Office: ([facility])___] : at the medical office located in  [Verbal consent obtained from patient] : the patient, [unfilled] [FreeTextEntry1] : 60 yo WM coming for f/u MNG, abnormal TFTs and prediabetes\par had COVID 2/2021 lost 10lb then gained back still has SOB \par on 100mcg Synthroid  dose decreased on 10/2019\par feels better eating less concentrated CHO\par also started therapy for restless leg syndrome, sleeping better but not at his best\par has also coming up steve with the ENT surgeon sees him once a year Dr Gonzalez \par        started Amphetamine with his Neurologist due to memory problems, does not help per pt, started 2/18\par        has sleep apnea, not tolerating the CPAP well\par        started to have hypoglicemia symtoms 2hrs after eating oatmeal with raisins, banana, doughnuts , sweating, better after eating more doughnuts \par \par        still suppressed TSH labs reviewed 7/2018 with normal FT4 \par        9/29/2017 Dr Gonzalez Gonzalez [place] path reviewed 1mm papillary thyroid cancer one side, then 4mm the other side with no vascular or lymphatic invasion, one lymph node negative \par        neck US normal 7/18\par        7/12/17\par        fasting sugar 113\par        triglycerides down from 300 to 204\par        seen PCP Bernadine Ramirez at [facility]\par        labs 5/2017\par        TSH 0.40 ( 0.49-4.70)\par        Free T3 3.4 (2.3-4.2)\par        FT4 0.82 (0.71-1.85)\par        Lyme + 4/25/17 last rash a year ago , will see a Lyme specialist in a week \par        has sleep apnea, has to repeat sleep study to check the accuracy of his CPAP machine , pending report, has had CPAP x 15 yrs\par        had deviated septum, tonsillectomy 2005\par        c/o extreme fatigue\par        seen Neurology for memory issues getting worse\par        HgA1c 5.7 4/25/17\par        4/25/17 TSH 0.43\par        B12 352\par        Triglycerides 390\par        4/3/14 TSH 0.7590\par        testosterone 428\par        prolactin 4.50\par        Triglycerides 437\par        about five years ago he was on testosterone gel, did nt help per pt, tried for 1-2 months.

## 2022-07-01 ENCOUNTER — NON-APPOINTMENT (OUTPATIENT)
Age: 62
End: 2022-07-01

## 2022-07-01 ENCOUNTER — APPOINTMENT (OUTPATIENT)
Dept: ENDOCRINOLOGY | Facility: CLINIC | Age: 62
End: 2022-07-01

## 2022-07-01 VITALS — WEIGHT: 195 LBS | HEIGHT: 67 IN | BODY MASS INDEX: 30.61 KG/M2

## 2022-07-01 DIAGNOSIS — R53.82 CHRONIC FATIGUE, UNSPECIFIED: ICD-10-CM

## 2022-07-01 PROCEDURE — 99215 OFFICE O/P EST HI 40 MIN: CPT

## 2022-07-01 NOTE — HISTORY OF PRESENT ILLNESS
[Home] : at home, [unfilled] , at the time of the visit. [Medical Office: ([facility])___] : at the medical office located in  [Verbal consent obtained from patient] : the patient, [unfilled] [FreeTextEntry1] : 62 yo WM coming for f/u MNG, abnormal TFTs and prediabetes\par had COVID 2/2021 lost 10lb then gained back still has SOB \par on 100mcg Synthroid  dose decreased on 10/2019\par feels better eating less concentrated CHO\par also started therapy for restless leg syndrome, sleeping better but not at his best\par has also coming up steve with the ENT surgeon sees him once a year Dr Gonzalez \par        started Amphetamine with his Neurologist due to memory problems, does not help per pt, started 2/18\par        has sleep apnea, not tolerating the CPAP well\par        started to have hypoglicemia symtoms 2hrs after eating oatmeal with raisins, banana, doughnuts , sweating, better after eating more doughnuts now has baggels for breakfast\par \par        still suppressed TSH labs reviewed 7/2018 with normal FT4 \par        9/29/2017 Dr Gonzalez [facility] path reviewed 1mm papillary thyroid cancer one side, then 4mm the other side with no vascular or lymphatic invasion, one lymph node negative \par        neck US normal 7/18\par        7/12/17\par        fasting sugar 113\par        triglycerides down from 300 to 204\par        seen PCP Bernadine Ramirez at [facility]\par        labs 5/2017\par        TSH 0.40 ( 0.49-4.70)\par        Free T3 3.4 (2.3-4.2)\par        FT4 0.82 (0.71-1.85)\par        Lyme + 4/25/17 last rash a year ago , will see a Lyme specialist in a week \par        has sleep apnea, has to repeat sleep study to check the accuracy of his CPAP machine , pending report, has had CPAP x 15 yrs\par        had deviated septum, tonsillectomy 2005\par        c/o extreme fatigue\par        seen Neurology for memory issues getting worse\par        HgA1c 5.7 4/25/17\par        4/25/17 TSH 0.43\par        B12 352\par        Triglycerides 390\par        4/3/14 TSH 0.7590\par        testosterone 428\par        prolactin 4.50\par        Triglycerides 437\par        about five years ago he was on testosterone gel, did nt help per pt, tried for 1-2 months.

## 2022-09-05 PROBLEM — R53.82 CHRONIC FATIGUE DISORDER: Status: ACTIVE | Noted: 2019-01-08

## 2022-09-05 NOTE — HISTORY OF PRESENT ILLNESS
[Home] : at home, [unfilled] , at the time of the visit. [Medical Office: ([facility])___] : at the medical office located in  [Verbal consent obtained from patient] : the patient, [unfilled] [FreeTextEntry1] : 61 yo WM coming for f/u MNG, abnormal TFTs and prediabetes\par \par on 100mcg Synthroid  dose decreased on 10/2019\par feels better eating less concentrated CHO\par also started therapy for restless leg syndrome, sleeping better but not at his best\par has also coming up steve with the ENT surgeon sees him once a year Dr Gonzalez \par        started Amphetamine with his Neurologist due to memory problems, does not help per pt, started 2/18\par        has sleep apnea, not tolerating the CPAP well\par        started to have hypoglicemia symtoms 2hrs after eating oatmeal with raisins, banana, doughnuts , sweating, better after eating more doughnuts now has baggels for breakfast\par \par        still suppressed TSH labs reviewed 7/2018 with normal FT4 \par        9/29/2017 Dr Gonzalez [facility] path reviewed 1mm papillary thyroid cancer one side, then 4mm the other side with no vascular or lymphatic invasion, one lymph node negative \par        neck US normal 7/18\par        7/12/17\par        fasting sugar 113\par        triglycerides down from 300 to 204\par        seen PCP Bernadine Ramirez at [facility]\par        labs 5/2017\par        TSH 0.40 ( 0.49-4.70)\par        Free T3 3.4 (2.3-4.2)\par        FT4 0.82 (0.71-1.85)\par        Lyme + 4/25/17 last rash a year ago , will see a Lyme specialist in a week \par        has sleep apnea, has to repeat sleep study to check the accuracy of his CPAP machine , pending report, has had CPAP x 15 yrs\par        had deviated septum, tonsillectomy 2005\par        c/o extreme fatigue\par        seen Neurology for memory issues getting worse\par        HgA1c 5.7 4/25/17\par        4/25/17 TSH 0.43\par        B12 352\par        Triglycerides 390\par        4/3/14 TSH 0.7590\par        testosterone 428\par        prolactin 4.50\par        Triglycerides 437\par        about five years ago he was on testosterone gel, did nt help per pt, tried for 1-2 months.

## 2022-09-05 NOTE — REVIEW OF SYSTEMS
[de-identified] : Constitutional: fatigue and recent weight loss: 7 lbs. \par Respiratory: shortness of breath on exertion. \par Musculoskeletal: joint pain, muscle weakness and back pain. \par Neurological: headaches . memory problems seen Neurology. \par Psychiatric: depression, insomnia and anxiety. \par Constitutional, Eyes, ENT, Cardiovascular, Respiratory, Gastrointestinal, Genitourinary, Musculoskeletal, Integumentary, Neurological, Psychiatric, Endocrine and Heme/Lymph are otherwise negative.  \par  \par

## 2022-11-18 ENCOUNTER — APPOINTMENT (OUTPATIENT)
Dept: ENDOCRINOLOGY | Facility: CLINIC | Age: 62
End: 2022-11-18

## 2022-11-18 VITALS
DIASTOLIC BLOOD PRESSURE: 70 MMHG | OXYGEN SATURATION: 98 % | WEIGHT: 200 LBS | HEIGHT: 67 IN | SYSTOLIC BLOOD PRESSURE: 114 MMHG | BODY MASS INDEX: 31.39 KG/M2 | HEART RATE: 68 BPM

## 2022-11-18 LAB — GLUCOSE BLDC GLUCOMTR-MCNC: 153

## 2022-11-18 PROCEDURE — 99215 OFFICE O/P EST HI 40 MIN: CPT | Mod: 25

## 2022-11-18 PROCEDURE — 82962 GLUCOSE BLOOD TEST: CPT

## 2022-11-18 PROCEDURE — G0447 BEHAVIOR COUNSEL OBESITY 15M: CPT

## 2022-11-18 RX ORDER — MULTIVIT-MIN/IRON/FOLIC ACID/K 18-600-40
CAPSULE ORAL
Refills: 0 | Status: ACTIVE | COMMUNITY

## 2022-11-18 RX ORDER — OMEPRAZOLE 20 MG/1
20 CAPSULE, DELAYED RELEASE ORAL DAILY
Refills: 0 | Status: ACTIVE | COMMUNITY

## 2022-11-18 RX ORDER — SILDENAFIL 100 MG/1
100 TABLET, FILM COATED ORAL
Qty: 4 | Refills: 0 | Status: ACTIVE | COMMUNITY
Start: 2022-07-20

## 2022-11-18 RX ORDER — COLD-HOT PACK
EACH MISCELLANEOUS DAILY
Refills: 0 | Status: ACTIVE | COMMUNITY

## 2022-11-18 RX ORDER — ATORVASTATIN CALCIUM 40 MG/1
40 TABLET, FILM COATED ORAL DAILY
Refills: 0 | Status: ACTIVE | COMMUNITY

## 2022-11-21 NOTE — HISTORY OF PRESENT ILLNESS
[Home] : at home, [unfilled] , at the time of the visit. [Medical Office: ([facility])___] : at the medical office located in  [Verbal consent obtained from patient] : the patient, [unfilled] [FreeTextEntry1] : 63 yo WM coming for f/u MNG, abnormal TFTs and prediabetes\par had COVID 2/2021 lost 10lb then gained back still has SOB \par on 100mcg Synthroid  dose decreased on 10/2019\par feels better eating less concentrated CHO\par also started therapy for restless leg syndrome, sleeping better but not at his best\par has also coming up steve with the ENT surgeon sees him once a year Dr Gonzalez \par        started Amphetamine with his Neurologist due to memory problems, does not help per pt, started 2/18\par        has sleep apnea, not tolerating the CPAP well\par        started to have hypoglicemia symtoms 2hrs after eating oatmeal with raisins, banana, doughnuts , sweating, better after eating more doughnuts now has baggels for breakfast\par \par        still suppressed TSH labs reviewed 7/2018 with normal FT4 \par        9/29/2017 Dr Gonzalez [facility] path reviewed 1mm papillary thyroid cancer one side, then 4mm the other side with no vascular or lymphatic invasion, one lymph node negative \par        neck US normal 7/18\par        7/12/17\par        fasting sugar 113\par        triglycerides down from 300 to 204\par        seen PCP Bernadine Ramirez at [facility]\par        labs 5/2017\par        TSH 0.40 ( 0.49-4.70)\par        Free T3 3.4 (2.3-4.2)\par        FT4 0.82 (0.71-1.85)\par        Lyme + 4/25/17 last rash a year ago , will see a Lyme specialist in a week \par        has sleep apnea, has to repeat sleep study to check the accuracy of his CPAP machine , pending report, has had CPAP x 15 yrs\par        had deviated septum, tonsillectomy 2005\par        c/o extreme fatigue\par        seen Neurology for memory issues getting worse\par        HgA1c 5.7 4/25/17\par        4/25/17 TSH 0.43\par        B12 352\par        Triglycerides 390\par        4/3/14 TSH 0.7590\par        testosterone 428\par        prolactin 4.50\par        Triglycerides 437\par        about five years ago he was on testosterone gel, did nt help per pt, tried for 1-2 months.

## 2022-11-21 NOTE — REVIEW OF SYSTEMS
[SOB on Exertion] : shortness of breath on exertion [Palpitations] : no palpitations [Cough] : no cough [FreeTextEntry6] : after pneumonia sees Pulmonary and Cardiology [FreeTextEntry5] : May 2021 heart monitor was normal  [de-identified] : Constitutional: fatigue and recent weight loss: 7 lbs. \par Respiratory: shortness of breath on exertion. \par Musculoskeletal: joint pain, muscle weakness and back pain. \par Neurological: headaches . memory problems seen Neurology. \par Psychiatric: depression, insomnia and anxiety. \par Constitutional, Eyes, ENT, Cardiovascular, Respiratory, Gastrointestinal, Genitourinary, Musculoskeletal, Integumentary, Neurological, Psychiatric, Endocrine and Heme/Lymph are otherwise negative.  \par  \par

## 2023-03-01 ENCOUNTER — APPOINTMENT (OUTPATIENT)
Dept: ENDOCRINOLOGY | Facility: CLINIC | Age: 63
End: 2023-03-01
Payer: COMMERCIAL

## 2023-03-01 VITALS
BODY MASS INDEX: 30.76 KG/M2 | HEART RATE: 65 BPM | SYSTOLIC BLOOD PRESSURE: 112 MMHG | HEIGHT: 67 IN | DIASTOLIC BLOOD PRESSURE: 70 MMHG | OXYGEN SATURATION: 98 % | WEIGHT: 196 LBS

## 2023-03-01 LAB — GLUCOSE BLDC GLUCOMTR-MCNC: 103

## 2023-03-01 PROCEDURE — 99215 OFFICE O/P EST HI 40 MIN: CPT | Mod: 25

## 2023-03-01 PROCEDURE — 82962 GLUCOSE BLOOD TEST: CPT

## 2023-03-01 PROCEDURE — G0447 BEHAVIOR COUNSEL OBESITY 15M: CPT

## 2023-03-01 NOTE — PHYSICAL EXAM
[Alert] : alert [Well Nourished] : well nourished [No Acute Distress] : no acute distress [Well Developed] : well developed [Normal Sclera/Conjunctiva] : normal sclera/conjunctiva [No Proptosis] : no proptosis [Normal Oropharynx] : the oropharynx was normal [No Respiratory Distress] : no respiratory distress [No Accessory Muscle Use] : no accessory muscle use [Normal Rate and Effort] : normal respiratory rate and effort [No Edema] : no peripheral edema [Not Distended] : not distended [No Spinal Tenderness] : no spinal tenderness [Spine Straight] : spine straight [No Stigmata of Cushings Syndrome] : no stigmata of Cushings Syndrome [Normal Gait] : normal gait [No Involuntary Movements] : no involuntary movements were seen [Normal Strength/Tone] : muscle strength and tone were normal [No Rash] : no rash [Normal Reflexes] : deep tendon reflexes were 2+ and symmetric [No Tremors] : no tremors [Oriented x3] : oriented to person, place, and time [Acanthosis Nigricans] : no acanthosis nigricans [de-identified] : m

## 2023-03-01 NOTE — HISTORY OF PRESENT ILLNESS
[FreeTextEntry1] : 61 yo WM coming for f/u MNG, abnormal TFTs and prediabetes\par had COVID 2/2021 lost 10lb then gained back still has SOB \par on 100mcg Synthroid  dose decreased on 10/2019\par feels better eating less concentrated CHO\par also started therapy for restless leg syndrome, sleeping better but not at his best\par has also coming up steve with the ENT surgeon sees him once a year Dr Gonzalez \par        started Amphetamine with his Neurologist due to memory problems, does not help per pt, started 2/18\par        has sleep apnea, not tolerating the CPAP well\par        started to have hypoglicemia symtoms 2hrs after eating oatmeal with raisins, banana, doughnuts , sweating, better after eating more doughnuts now has baggels for breakfast\par \par        still suppressed TSH labs reviewed 7/2018 with normal FT4 \par        9/29/2017 Dr Gonzalez [facility] path reviewed 1mm papillary thyroid cancer one side, then 4mm the other side with no vascular or lymphatic invasion, one lymph node negative \par        neck US normal 7/18\par        7/12/17\par        fasting sugar 113\par        triglycerides down from 300 to 204\par        seen PCP Bernadine Ramirez at [facility]\par        labs 5/2017\par        TSH 0.40 ( 0.49-4.70)\par        Free T3 3.4 (2.3-4.2)\par        FT4 0.82 (0.71-1.85)\par        Lyme + 4/25/17 last rash a year ago , will see a Lyme specialist in a week \par        has sleep apnea, has to repeat sleep study to check the accuracy of his CPAP machine , pending report, has had CPAP x 15 yrs\par        had deviated septum, tonsillectomy 2005\par        c/o extreme fatigue\par        seen Neurology for memory issues getting worse\par        HgA1c 5.7 4/25/17\par        4/25/17 TSH 0.43\par        B12 352\par        Triglycerides 390\par        4/3/14 TSH 0.7590\par        testosterone 428\par        prolactin 4.50\par        Triglycerides 437\par        about five years ago he was on testosterone gel, did nt help per pt, tried for 1-2 months.
No

## 2023-03-01 NOTE — REVIEW OF SYSTEMS
[SOB on Exertion] : shortness of breath on exertion [Palpitations] : no palpitations [Cough] : no cough [FreeTextEntry5] : May 2021 heart monitor was normal  [FreeTextEntry6] : after pneumonia sees Pulmonary and Cardiology [de-identified] : Constitutional: fatigue and recent weight loss: 7 lbs. \par Respiratory: shortness of breath on exertion. \par Musculoskeletal: joint pain, muscle weakness and back pain. \par Neurological: headaches . memory problems seen Neurology. \par Psychiatric: depression, insomnia and anxiety. \par Constitutional, Eyes, ENT, Cardiovascular, Respiratory, Gastrointestinal, Genitourinary, Musculoskeletal, Integumentary, Neurological, Psychiatric, Endocrine and Heme/Lymph are otherwise negative.  \par  \par

## 2023-05-03 ENCOUNTER — RX RENEWAL (OUTPATIENT)
Age: 63
End: 2023-05-03

## 2023-06-02 ENCOUNTER — APPOINTMENT (OUTPATIENT)
Dept: ENDOCRINOLOGY | Facility: CLINIC | Age: 63
End: 2023-06-02
Payer: COMMERCIAL

## 2023-06-02 VITALS — HEIGHT: 67 IN | WEIGHT: 190 LBS | BODY MASS INDEX: 29.82 KG/M2
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
## 2023-06-02 PROCEDURE — 99215 OFFICE O/P EST HI 40 MIN: CPT | Mod: 95

## 2023-06-02 NOTE — HISTORY OF PRESENT ILLNESS
[Home] : at home, [unfilled] , at the time of the visit. [Medical Office: ([facility])___] : at the medical office located in  [Verbal consent obtained from patient] : the patient, [unfilled] [FreeTextEntry1] : 61 yo WM coming for f/u MNG, abnormal TFTs and prediabetes\par had COVID 2/2021 lost 10lb then gained back still has SOB \par on 100mcg Synthroid  dose decreased on 10/2019\par feels better eating less concentrated CHO\par also started therapy for restless leg syndrome, sleeping better but not at his best\par has also coming up steve with the ENT surgeon sees him once a year Dr Gonzalez \par        started Amphetamine with his Neurologist due to memory problems, does not help per pt, started 2/18\par        has sleep apnea, not tolerating the CPAP well\par        started to have hypoglicemia symtoms 2hrs after eating oatmeal with raisins, banana, doughnuts , sweating, better after eating more doughnuts now has baggels for breakfast\par \par        still suppressed TSH labs reviewed 7/2018 with normal FT4 \par        9/29/2017 Dr Gonzalez [facility] path reviewed 1mm papillary thyroid cancer one side, then 4mm the other side with no vascular or lymphatic invasion, one lymph node negative \par        neck US normal 7/18\par        7/12/17\par        fasting sugar 113\par        triglycerides down from 300 to 204\par        seen PCP Bernadine Ramirez at [facility]\par        labs 5/2017\par        TSH 0.40 ( 0.49-4.70)\par        Free T3 3.4 (2.3-4.2)\par        FT4 0.82 (0.71-1.85)\par        Lyme + 4/25/17 last rash a year ago , will see a Lyme specialist in a week \par        has sleep apnea, has to repeat sleep study to check the accuracy of his CPAP machine , pending report, has had CPAP x 15 yrs\par        had deviated septum, tonsillectomy 2005\par        c/o extreme fatigue\par        seen Neurology for memory issues getting worse\par        HgA1c 5.7 4/25/17\par        4/25/17 TSH 0.43\par        B12 352\par        Triglycerides 390\par        4/3/14 TSH 0.7590\par        testosterone 428\par        prolactin 4.50\par        Triglycerides 437\par        about five years ago he was on testosterone gel, did nt help per pt, tried for 1-2 months.

## 2023-06-02 NOTE — REVIEW OF SYSTEMS
[SOB on Exertion] : shortness of breath on exertion [Palpitations] : no palpitations [Cough] : no cough [FreeTextEntry5] : May 2021 heart monitor was normal  [FreeTextEntry6] : after pneumonia sees Pulmonary and Cardiology [de-identified] : Constitutional: fatigue and recent weight loss: 7 lbs. \par Respiratory: shortness of breath on exertion. \par Musculoskeletal: joint pain, muscle weakness and back pain. \par Neurological: headaches . memory problems seen Neurology. \par Psychiatric: depression, insomnia and anxiety. \par Constitutional, Eyes, ENT, Cardiovascular, Respiratory, Gastrointestinal, Genitourinary, Musculoskeletal, Integumentary, Neurological, Psychiatric, Endocrine and Heme/Lymph are otherwise negative.  \par  \par

## 2023-06-02 NOTE — PHYSICAL EXAM
[Alert] : alert [Well Nourished] : well nourished [No Acute Distress] : no acute distress [Well Developed] : well developed [Normal Sclera/Conjunctiva] : normal sclera/conjunctiva [No Proptosis] : no proptosis [Normal Oropharynx] : the oropharynx was normal [No Respiratory Distress] : no respiratory distress [No Accessory Muscle Use] : no accessory muscle use [Normal Rate and Effort] : normal respiratory rate and effort [No Edema] : no peripheral edema [Not Distended] : not distended [No Spinal Tenderness] : no spinal tenderness [Spine Straight] : spine straight [No Stigmata of Cushings Syndrome] : no stigmata of Cushings Syndrome [Normal Gait] : normal gait [No Involuntary Movements] : no involuntary movements were seen [Normal Strength/Tone] : muscle strength and tone were normal [No Rash] : no rash [Normal Reflexes] : deep tendon reflexes were 2+ and symmetric [No Tremors] : no tremors [Oriented x3] : oriented to person, place, and time [Acanthosis Nigricans] : no acanthosis nigricans

## 2023-10-25 ENCOUNTER — RX RENEWAL (OUTPATIENT)
Age: 63
End: 2023-10-25

## 2023-12-01 ENCOUNTER — APPOINTMENT (OUTPATIENT)
Dept: ENDOCRINOLOGY | Facility: CLINIC | Age: 63
End: 2023-12-01
Payer: COMMERCIAL

## 2023-12-01 VITALS
DIASTOLIC BLOOD PRESSURE: 78 MMHG | HEIGHT: 67 IN | BODY MASS INDEX: 31.08 KG/M2 | OXYGEN SATURATION: 97 % | HEART RATE: 74 BPM | WEIGHT: 198 LBS | SYSTOLIC BLOOD PRESSURE: 118 MMHG

## 2023-12-01 DIAGNOSIS — G47.33 OBSTRUCTIVE SLEEP APNEA (ADULT) (PEDIATRIC): ICD-10-CM

## 2023-12-01 DIAGNOSIS — E78.1 PURE HYPERGLYCERIDEMIA: ICD-10-CM

## 2023-12-01 PROCEDURE — G0447 BEHAVIOR COUNSEL OBESITY 15M: CPT | Mod: 59

## 2023-12-01 PROCEDURE — 99215 OFFICE O/P EST HI 40 MIN: CPT | Mod: 25

## 2023-12-01 RX ORDER — MELATONIN 5 MG
5 CAPSULE ORAL
Refills: 0 | Status: ACTIVE | COMMUNITY

## 2023-12-03 PROBLEM — G47.33 OBSTRUCTIVE SLEEP APNEA: Status: ACTIVE | Noted: 2020-04-15

## 2023-12-03 PROBLEM — E78.1 HYPERTRIGLYCERIDEMIA: Status: ACTIVE | Noted: 2019-01-08

## 2024-03-15 ENCOUNTER — APPOINTMENT (OUTPATIENT)
Dept: ENDOCRINOLOGY | Facility: CLINIC | Age: 64
End: 2024-03-15
Payer: COMMERCIAL

## 2024-03-15 PROCEDURE — 36415 COLL VENOUS BLD VENIPUNCTURE: CPT

## 2024-03-27 ENCOUNTER — APPOINTMENT (OUTPATIENT)
Dept: ENDOCRINOLOGY | Facility: CLINIC | Age: 64
End: 2024-03-27
Payer: COMMERCIAL

## 2024-03-27 DIAGNOSIS — R35.89 OTHER POLYURIA: ICD-10-CM

## 2024-03-27 PROCEDURE — 95251 CONT GLUC MNTR ANALYSIS I&R: CPT

## 2024-03-27 RX ORDER — NALTREXONE HYDROCHLORIDE AND BUPROPION HYDROCHLORIDE 8; 90 MG/1; MG/1
8-90 TABLET, EXTENDED RELEASE ORAL
Qty: 60 | Refills: 2 | Status: DISCONTINUED | COMMUNITY
Start: 2023-12-01 | End: 2024-03-27

## 2024-03-27 NOTE — HISTORY OF PRESENT ILLNESS
[FreeTextEntry1] : 64 yo WM coming for f/u MNG, abnormal TFTs and prediabetes had COVID 2/2021 lost 10lb then gained back still has SOB  on 100mcg Synthroid  dose decreased on 10/2019 feels better eating less concentrated CHO also started therapy for restless leg syndrome, sleeping better but not at his best has also coming up steve with the ENT surgeon sees him once a year Dr Gonzalez         started Amphetamine with his Neurologist due to memory problems, does not help per pt, started 2/18        has sleep apnea, not tolerating the CPAP well        started to have hypoglicemia symtoms 2hrs after eating oatmeal with raisins, banana, doughnuts , sweating, better after eating more doughnuts now has baggels for breakfast         still suppressed TSH labs reviewed 7/2018 with normal FT4         9/29/2017 Dr Gonzalez [facility] path reviewed 1mm papillary thyroid cancer one side, then 4mm the other side with no vascular or lymphatic invasion, one lymph node negative         neck US normal 7/18 7/12/17        fasting sugar 113        triglycerides down from 300 to 204        seen PCP Bernadnie Ramirez at [facility]        labs 5/2017        TSH 0.40 ( 0.49-4.70)        Free T3 3.4 (2.3-4.2)        FT4 0.82 (0.71-1.85)        Lyme + 4/25/17 last rash a year ago , will see a Lyme specialist in a week         has sleep apnea, has to repeat sleep study to check the accuracy of his CPAP machine , pending report, has had CPAP x 15 yrs        had deviated septum, tonsillectomy 2005        c/o extreme fatigue        seen Neurology for memory issues getting worse        HgA1c 5.7 4/25/17 4/25/17 TSH 0.43        B12 352        Triglycerides 390        4/3/14 TSH 0.7590        testosterone 428        prolactin 4.50        Triglycerides 437        about five years ago he was on testosterone gel, did nt help per pt, tried for 1-2 months.

## 2024-03-29 RX ORDER — METFORMIN ER 500 MG 500 MG/1
500 TABLET ORAL
Qty: 360 | Refills: 1 | Status: ACTIVE | COMMUNITY
Start: 2020-09-28 | End: 1900-01-01

## 2024-05-17 ENCOUNTER — APPOINTMENT (OUTPATIENT)
Dept: ENDOCRINOLOGY | Facility: CLINIC | Age: 64
End: 2024-05-17
Payer: COMMERCIAL

## 2024-05-17 VITALS
OXYGEN SATURATION: 99 % | DIASTOLIC BLOOD PRESSURE: 66 MMHG | WEIGHT: 197 LBS | SYSTOLIC BLOOD PRESSURE: 100 MMHG | BODY MASS INDEX: 30.92 KG/M2 | HEART RATE: 70 BPM | HEIGHT: 67 IN

## 2024-05-17 DIAGNOSIS — R53.82 CHRONIC FATIGUE, UNSPECIFIED: ICD-10-CM

## 2024-05-17 DIAGNOSIS — C73 MALIGNANT NEOPLASM OF THYROID GLAND: ICD-10-CM

## 2024-05-17 DIAGNOSIS — E53.8 DEFICIENCY OF OTHER SPECIFIED B GROUP VITAMINS: ICD-10-CM

## 2024-05-17 DIAGNOSIS — M25.552 PAIN IN RIGHT HIP: ICD-10-CM

## 2024-05-17 DIAGNOSIS — E66.9 OBESITY, UNSPECIFIED: ICD-10-CM

## 2024-05-17 DIAGNOSIS — R73.03 PREDIABETES.: ICD-10-CM

## 2024-05-17 DIAGNOSIS — R73.09 OTHER ABNORMAL GLUCOSE: ICD-10-CM

## 2024-05-17 DIAGNOSIS — M25.551 PAIN IN RIGHT HIP: ICD-10-CM

## 2024-05-17 DIAGNOSIS — E55.9 VITAMIN D DEFICIENCY, UNSPECIFIED: ICD-10-CM

## 2024-05-17 DIAGNOSIS — E89.0 POSTPROCEDURAL HYPOTHYROIDISM: ICD-10-CM

## 2024-05-17 PROCEDURE — 99215 OFFICE O/P EST HI 40 MIN: CPT

## 2024-05-17 RX ORDER — LEVOTHYROXINE SODIUM 100 UG/1
100 TABLET ORAL
Qty: 90 | Refills: 3 | Status: ACTIVE | COMMUNITY
Start: 2019-10-16 | End: 1900-01-01

## 2024-05-17 RX ORDER — BLOOD-GLUCOSE SENSOR
EACH MISCELLANEOUS
Qty: 2 | Refills: 6 | Status: ACTIVE | COMMUNITY
Start: 2024-05-17 | End: 1900-01-01

## 2024-05-18 NOTE — PHYSICAL EXAM
[Alert] : alert [Well Nourished] : well nourished [No Acute Distress] : no acute distress [Well Developed] : well developed [Normal Sclera/Conjunctiva] : normal sclera/conjunctiva [No Proptosis] : no proptosis [Normal Oropharynx] : the oropharynx was normal [No Respiratory Distress] : no respiratory distress [No Accessory Muscle Use] : no accessory muscle use [Normal Rate and Effort] : normal respiratory rate and effort [No Edema] : no peripheral edema [Not Distended] : not distended [No Spinal Tenderness] : no spinal tenderness [Spine Straight] : spine straight [No Stigmata of Cushings Syndrome] : no stigmata of Cushings Syndrome [No Involuntary Movements] : no involuntary movements were seen [Normal Gait] : normal gait [Normal Strength/Tone] : muscle strength and tone were normal [No Rash] : no rash [Normal Reflexes] : deep tendon reflexes were 2+ and symmetric [No Tremors] : no tremors [Oriented x3] : oriented to person, place, and time [Acanthosis Nigricans] : no acanthosis nigricans

## 2024-05-18 NOTE — REVIEW OF SYSTEMS
[Fatigue] : fatigue [SOB on Exertion] : shortness of breath on exertion [Joint Pain] : joint pain [Palpitations] : no palpitations [Cough] : no cough [FreeTextEntry5] : May 2021 heart monitor was normal  [FreeTextEntry6] : after pneumonia sees Pulmonary and Cardiology [de-identified] : Constitutional: fatigue and recent weight loss: 7 lbs. \par  Respiratory: shortness of breath on exertion. \par  Musculoskeletal: joint pain, muscle weakness and back pain. \par  Neurological: headaches . memory problems seen Neurology. \par  Psychiatric: depression, insomnia and anxiety. \par  Constitutional, Eyes, ENT, Cardiovascular, Respiratory, Gastrointestinal, Genitourinary, Musculoskeletal, Integumentary, Neurological, Psychiatric, Endocrine and Heme/Lymph are otherwise negative.  \par   \par

## 2024-05-18 NOTE — END OF VISIT
[FreeTextEntry3] : I, Dilia Florentino, am scribing for and in the presence of Dr. Susy Almaraz in the following sections: HISTORY OF PRESENT ILLNESS; REVIEW OF SYSTEMS; PHYSICAL EXAM; ASSESSMENT/ PLAN. ISusy, personally performed the services described in the documentation, reviewed the documentation recorded by the scribe in my presence, and it accurately and completely records my words and actions. 5/17/2024. [Time Spent: ___ minutes] : I have spent [unfilled] minutes of time on the encounter.

## 2024-05-18 NOTE — HISTORY OF PRESENT ILLNESS
[FreeTextEntry1] : 62 yo WM coming for f/u MNG, abnormal TFTs and prediabetes had COVID 2/2021 lost 10lb then gained back still has SOB  on 100mcg Synthroid  dose decreased on 10/2019 feels better eating less concentrated CHO also started therapy for restless leg syndrome, sleeping better but not at his best has also coming up steve with the ENT surgeon sees him once a year Dr Gonzalez         started Amphetamine with his Neurologist due to memory problems, does not help per pt, started 2/18        has sleep apnea, not tolerating the CPAP well        started to have hypoglicemia symtoms 2hrs after eating oatmeal with raisins, banana, doughnuts , sweating, better after eating more doughnuts now has baggels for breakfast         still suppressed TSH labs reviewed 7/2018 with normal FT4         9/29/2017 Dr Gonzalez [facility] path reviewed 1mm papillary thyroid cancer one side, then 4mm the other side with no vascular or lymphatic invasion, one lymph node negative         neck US normal 7/18 7/12/17        fasting sugar 113        triglycerides down from 300 to 204        seen PCP Bernadine Ramirez at [facility]        labs 5/2017        TSH 0.40 ( 0.49-4.70)        Free T3 3.4 (2.3-4.2)        FT4 0.82 (0.71-1.85)        Lyme + 4/25/17 last rash a year ago , will see a Lyme specialist in a week         has sleep apnea, has to repeat sleep study to check the accuracy of his CPAP machine , pending report, has had CPAP x 15 yrs        had deviated septum, tonsillectomy 2005        c/o extreme fatigue        seen Neurology for memory issues getting worse        HgA1c 5.7 4/25/17 4/25/17 TSH 0.43        B12 352        Triglycerides 390        4/3/14 TSH 0.7590        testosterone 428        prolactin 4.50        Triglycerides 437        about five years ago he was on testosterone gel, did nt help per pt, tried for 1-2 months.  05/17/2024 f/u Doing good. He's been feeling more tired lately.  Never tested positive for Lyme despite being bitten by several ticks. Pt has gone to infectious disease in the past with negative workup. Reports frequent joint pain and fatigue. States that roughly once a month he developed bruising on his upper extremities with pain in his bilateral hands, ankles, and knees. He also feels that his memory has been affected as well.  Sugar was 111 today. He has been eating hard boiled eggs for breakfast.  Checked blood sugar for a month with [employer]. The highest is 180s and the lowest is just below 100s.  Typically had highs to 250-300 around the middle of the day and evenings. Pt reports he does not typically eat lunch but tends to feel tired around 12 noon each day.  Was unable to tolerate extra metformin dose.

## 2024-08-02 ENCOUNTER — RESULT REVIEW (OUTPATIENT)
Age: 64
End: 2024-08-02

## 2024-08-02 ENCOUNTER — APPOINTMENT (OUTPATIENT)
Dept: RHEUMATOLOGY | Facility: CLINIC | Age: 64
End: 2024-08-02
Payer: COMMERCIAL

## 2024-08-02 VITALS
BODY MASS INDEX: 28.56 KG/M2 | SYSTOLIC BLOOD PRESSURE: 100 MMHG | DIASTOLIC BLOOD PRESSURE: 64 MMHG | HEART RATE: 67 BPM | HEIGHT: 67 IN | TEMPERATURE: 98.1 F | WEIGHT: 182 LBS | OXYGEN SATURATION: 99 %

## 2024-08-02 DIAGNOSIS — M25.562 PAIN IN RIGHT KNEE: ICD-10-CM

## 2024-08-02 DIAGNOSIS — M25.561 PAIN IN RIGHT KNEE: ICD-10-CM

## 2024-08-02 DIAGNOSIS — A69.20 LYME DISEASE, UNSPECIFIED: ICD-10-CM

## 2024-08-02 PROCEDURE — 99204 OFFICE O/P NEW MOD 45 MIN: CPT

## 2024-08-02 RX ORDER — MELOXICAM 15 MG/1
15 TABLET ORAL
Qty: 21 | Refills: 1 | Status: ACTIVE | COMMUNITY
Start: 2024-08-02 | End: 2024-09-13

## 2024-08-02 NOTE — HISTORY OF PRESENT ILLNESS
[FreeTextEntry1] : 65yo M in the office for evaluation. PMHx thyroud ca, s/p thyroidectomy, pre-dm, HLD  History: patient with pain years of symptoms 5-6 years lyme infections had at the skin typical skin rash multiple lyme evaluations recent lyme testing negative arthralgias migratory elbow, knee, hips episodic periods of minimal symptoms no synovitis no clinical am stiffness no associated skin rashes no other inflammatory findings reported pain is worse with activities nsaids, +/- releifcherievjhonathan

## 2024-09-23 ENCOUNTER — RX RENEWAL (OUTPATIENT)
Age: 64
End: 2024-09-23

## 2024-11-20 ENCOUNTER — APPOINTMENT (OUTPATIENT)
Dept: ENDOCRINOLOGY | Facility: CLINIC | Age: 64
End: 2024-11-20

## 2024-11-20 VITALS — BODY MASS INDEX: 27.62 KG/M2 | HEIGHT: 67 IN | WEIGHT: 176 LBS

## 2024-11-20 DIAGNOSIS — R53.82 CHRONIC FATIGUE, UNSPECIFIED: ICD-10-CM

## 2024-11-20 DIAGNOSIS — E66.811 OBESITY, CLASS 1: ICD-10-CM

## 2024-11-20 DIAGNOSIS — E89.0 POSTPROCEDURAL HYPOTHYROIDISM: ICD-10-CM

## 2024-11-20 DIAGNOSIS — M25.552 PAIN IN RIGHT HIP: ICD-10-CM

## 2024-11-20 DIAGNOSIS — R73.03 PREDIABETES.: ICD-10-CM

## 2024-11-20 DIAGNOSIS — E53.8 DEFICIENCY OF OTHER SPECIFIED B GROUP VITAMINS: ICD-10-CM

## 2024-11-20 DIAGNOSIS — R73.09 OTHER ABNORMAL GLUCOSE: ICD-10-CM

## 2024-11-20 DIAGNOSIS — E55.9 VITAMIN D DEFICIENCY, UNSPECIFIED: ICD-10-CM

## 2024-11-20 DIAGNOSIS — M25.551 PAIN IN RIGHT HIP: ICD-10-CM

## 2024-11-20 DIAGNOSIS — C73 MALIGNANT NEOPLASM OF THYROID GLAND: ICD-10-CM

## 2024-12-11 ENCOUNTER — RX RENEWAL (OUTPATIENT)
Age: 64
End: 2024-12-11

## 2025-02-21 ENCOUNTER — APPOINTMENT (OUTPATIENT)
Dept: ENDOCRINOLOGY | Facility: CLINIC | Age: 65
End: 2025-02-21

## 2025-04-08 ENCOUNTER — NON-APPOINTMENT (OUTPATIENT)
Age: 65
End: 2025-04-08

## 2025-04-09 ENCOUNTER — RX RENEWAL (OUTPATIENT)
Age: 65
End: 2025-04-09

## 2025-04-10 ENCOUNTER — APPOINTMENT (OUTPATIENT)
Dept: ENDOCRINOLOGY | Facility: CLINIC | Age: 65
End: 2025-04-10
Payer: COMMERCIAL

## 2025-04-10 VITALS — WEIGHT: 182 LBS | HEIGHT: 67 IN | BODY MASS INDEX: 28.56 KG/M2

## 2025-04-10 DIAGNOSIS — R73.03 PREDIABETES.: ICD-10-CM

## 2025-04-10 DIAGNOSIS — E66.811 OBESITY, CLASS 1: ICD-10-CM

## 2025-04-10 DIAGNOSIS — E55.9 VITAMIN D DEFICIENCY, UNSPECIFIED: ICD-10-CM

## 2025-04-10 DIAGNOSIS — M25.551 PAIN IN RIGHT HIP: ICD-10-CM

## 2025-04-10 DIAGNOSIS — E53.8 DEFICIENCY OF OTHER SPECIFIED B GROUP VITAMINS: ICD-10-CM

## 2025-04-10 DIAGNOSIS — C73 MALIGNANT NEOPLASM OF THYROID GLAND: ICD-10-CM

## 2025-04-10 DIAGNOSIS — R53.82 CHRONIC FATIGUE, UNSPECIFIED: ICD-10-CM

## 2025-04-10 DIAGNOSIS — E89.0 POSTPROCEDURAL HYPOTHYROIDISM: ICD-10-CM

## 2025-04-10 DIAGNOSIS — M25.552 PAIN IN RIGHT HIP: ICD-10-CM

## 2025-04-10 PROCEDURE — G2211 COMPLEX E/M VISIT ADD ON: CPT | Mod: 95

## 2025-04-10 PROCEDURE — 99215 OFFICE O/P EST HI 40 MIN: CPT | Mod: 95

## 2025-07-10 ENCOUNTER — APPOINTMENT (OUTPATIENT)
Dept: BARIATRICS/WEIGHT MGMT | Facility: CLINIC | Age: 65
End: 2025-07-10
Payer: COMMERCIAL

## 2025-07-10 ENCOUNTER — APPOINTMENT (OUTPATIENT)
Dept: ENDOCRINOLOGY | Facility: CLINIC | Age: 65
End: 2025-07-10

## 2025-07-10 PROCEDURE — 99213 OFFICE O/P EST LOW 20 MIN: CPT | Mod: 95

## 2025-07-10 RX ORDER — BLOOD-GLUCOSE SENSOR
EACH MISCELLANEOUS
Qty: 6 | Refills: 3 | Status: ACTIVE | COMMUNITY
Start: 2025-07-10 | End: 1900-01-01

## 2025-09-19 ENCOUNTER — RX RENEWAL (OUTPATIENT)
Age: 65
End: 2025-09-19